# Patient Record
(demographics unavailable — no encounter records)

---

## 2024-12-12 NOTE — HISTORY OF PRESENT ILLNESS
[FreeTextEntry1] : 05/22/2024 CC: f/u 63 yr old male with past medical history bph  h/o retention uti on tamsulosin finasteride reports voiding well  06/07/2024 cc f/u to review Renal U/S and possible cystoscopy  63 year old male presents for a f/u visit to review Renal U/S.  Renal U/S today Findings: Unremarkable exam. Both kidneys are normal in size and echogenicity without hydronephrosis, stones or solid masses visualized.  8/29 voiding well last few weeks feels a little tired  12/12/2024 cc f/u visit Pt is a 64 year old male presenting for a f/u visit. Pt reports his urination has improved withe the use of his medication. Pt reports good urinary flow. Pt reports a little pain in lower abdomen area but could be due to increased movement and exercise.

## 2024-12-12 NOTE — END OF VISIT
[FreeTextEntry4] : This note was written by Fidelina Gonzalez on 12/12/2024 actively solely Guevara Hubbard M.D. I, Fidelina Gonzalez, am scribing for and in the presence of Guevara Hubbard M.D. in the following sections HISTORY OF PRESENT ILLNESS, PAST MEDICAL/FAMILY/SOCIAL HISTORY; REVIEW OF SYSTEMS; VITAL SIGNS; PHYSICAL EXAM; ASSESSMENT/PLAN. All medical record entries made by this scribe at , Guevara Hubbard M.D. direction and personally dictated by me on 12/12/2024. I personally performed the services described in the documentation, reviewed the documentation recorded by the scribe in my presence, and it accurately and completely records my words and actions.

## 2024-12-12 NOTE — ASSESSMENT
[FreeTextEntry1] : Pt is a 64 year old male with BPH without urinary obstruction.  doing well   UA and Culture done today BMP and PSA drawn today Rx for finasteride Rx for tamsulosin    Patient is being seen today for evaluation and management of a chronic and longitudinal ongoing condition and I am of the primary treating physician.

## 2025-06-12 NOTE — END OF VISIT
[FreeTextEntry4] : This note was written by Fidelina Gonzalez on 06/12/2025 actively solely Guevara Hubbard M.D. I, Fidelina Gonzalez, am scribing for and in the presence of Guevara Hubbard M.D. in the following sections HISTORY OF PRESENT ILLNESS, PAST MEDICAL/FAMILY/SOCIAL HISTORY; REVIEW OF SYSTEMS; VITAL SIGNS; PHYSICAL EXAM; ASSESSMENT/PLAN. All medical record entries made by this scribe at , Guevara Hubbard M.D. direction and personally dictated by me on 06/12/2025. I personally performed the services described in the documentation, reviewed the documentation recorded by the scribe in my presence, and it accurately and completely records my words and actions.

## 2025-06-12 NOTE — ASSESSMENT
[FreeTextEntry1] : Pt is a 64 year old male with BPH with urinary obstruction.  pvr reviewed 53 ml advise f/u cardiology   UA and culture done today BMP and PSA drawn today cont tamsulsoin finasteride     Patient is being seen today for evaluation and management of a chronic and longitudinal ongoing condition and I am of the primary treating physician.

## 2025-06-12 NOTE — HISTORY OF PRESENT ILLNESS
[FreeTextEntry1] : 05/22/2024 CC: f/u 63 yr old male with past medical history bph  h/o retention uti on tamsulosin finasteride reports voiding well  06/07/2024 cc f/u to review Renal U/S and possible cystoscopy  63 year old male presents for a f/u visit to review Renal U/S.  Renal U/S today Findings: Unremarkable exam. Both kidneys are normal in size and echogenicity without hydronephrosis, stones or solid masses visualized.  8/29 voiding well last few weeks feels a little tired  12/12/2024 cc f/u visit Pt is a 64 year old male presenting for a f/u visit. Pt reports his urination has improved withe the use of his medication. Pt reports good urinary flow. Pt reports a little pain in lower abdomen area but could be due to increased movement and exercise.    06/12/2025 cc f/u visit Pt is a 64 year old male presenting for f/u visit. Pt reports urination is fine on tamsulosin and finasteride. Pt reports his legs are very swollen and his body is retaining liquid.